# Patient Record
Sex: FEMALE | Race: WHITE | NOT HISPANIC OR LATINO | Employment: FULL TIME | URBAN - METROPOLITAN AREA
[De-identification: names, ages, dates, MRNs, and addresses within clinical notes are randomized per-mention and may not be internally consistent; named-entity substitution may affect disease eponyms.]

---

## 2020-02-26 ENCOUNTER — APPOINTMENT (OUTPATIENT)
Dept: RADIOLOGY | Facility: IMAGING CENTER | Age: 40
End: 2020-02-26
Attending: PHYSICIAN ASSISTANT
Payer: COMMERCIAL

## 2020-02-26 ENCOUNTER — OFFICE VISIT (OUTPATIENT)
Dept: URGENT CARE | Facility: CLINIC | Age: 40
End: 2020-02-26
Payer: COMMERCIAL

## 2020-02-26 VITALS
HEIGHT: 63 IN | BODY MASS INDEX: 22.5 KG/M2 | OXYGEN SATURATION: 94 % | RESPIRATION RATE: 16 BRPM | SYSTOLIC BLOOD PRESSURE: 98 MMHG | DIASTOLIC BLOOD PRESSURE: 72 MMHG | TEMPERATURE: 99.2 F | HEART RATE: 83 BPM | WEIGHT: 127 LBS

## 2020-02-26 DIAGNOSIS — S60.221A CONTUSION OF RIGHT HAND, INITIAL ENCOUNTER: ICD-10-CM

## 2020-02-26 DIAGNOSIS — M79.641 HAND PAIN, RIGHT: ICD-10-CM

## 2020-02-26 PROCEDURE — 73130 X-RAY EXAM OF HAND: CPT | Mod: TC,RT | Performed by: PHYSICIAN ASSISTANT

## 2020-02-26 PROCEDURE — 99213 OFFICE O/P EST LOW 20 MIN: CPT | Performed by: PHYSICIAN ASSISTANT

## 2020-02-26 ASSESSMENT — ENCOUNTER SYMPTOMS
TINGLING: 0
FEVER: 0
WEAKNESS: 0
ARTHRALGIAS: 1
MYALGIAS: 0
SENSORY CHANGE: 0
NUMBNESS: 0
CHILLS: 0

## 2020-02-26 NOTE — PROGRESS NOTES
"Subjective:      Maria Dolores Lincoln is a 39 y.o. female who presents with Hand Injury (right hand, colided with counter top)            Hand Injury   This is a new problem. The current episode started today (Patient was shaking a bag and hit right hand on her kitchen counter). The problem occurs constantly. The problem has been unchanged. Associated symptoms include arthralgias (right hand pain and swelling ). Pertinent negatives include no chills, fever, myalgias, numbness, rash or weakness. Exacerbated by: palpation, movement  She has tried nothing for the symptoms.       Past Medical History:   Diagnosis Date   • Asthma    • Scoliosis        Past Surgical History:   Procedure Laterality Date   • OTHER ORTHOPEDIC SURGERY      for scoliosis   • TENDON REPAIR      R hand       No family history on file.    Allergies   Allergen Reactions   • Codeine Vomiting       Medications, Allergies, and current problem list reviewed today in Epic    Review of Systems   Constitutional: Negative for chills and fever.   Musculoskeletal: Positive for arthralgias (right hand pain and swelling ) and joint pain (right hand pain). Negative for myalgias.   Skin: Negative for rash.        Bruising noted on right hand    Neurological: Negative for tingling, sensory change, weakness and numbness.      All other systems reviewed and are negative.        Objective:     BP (!) 98/72 (BP Location: Right arm, Patient Position: Sitting, BP Cuff Size: Adult)   Pulse 83   Temp 37.3 °C (99.2 °F) (Temporal)   Resp 16   Ht 1.6 m (5' 3\")   Wt 57.6 kg (127 lb)   SpO2 94%   BMI 22.50 kg/m²      Physical Exam  Constitutional:       General: She is not in acute distress.  HENT:      Head: Normocephalic and atraumatic.   Eyes:      Conjunctiva/sclera: Conjunctivae normal.   Cardiovascular:      Rate and Rhythm: Normal rate.   Pulmonary:      Effort: Pulmonary effort is normal. No respiratory distress.   Musculoskeletal:        Hands:    Neurological:    "   General: No focal deficit present.      Mental Status: She is alert and oriented to person, place, and time.   Psychiatric:         Mood and Affect: Mood normal.         Behavior: Behavior normal.         Thought Content: Thought content normal.         Judgment: Judgment normal.           2/26/2020 12:19 PM     HISTORY/REASON FOR EXAM:  Pain/Deformity Following Trauma  Second and third MCP pain     TECHNIQUE/EXAM DESCRIPTION AND NUMBER OF VIEWS:  3 views of the RIGHT hand.     COMPARISON: None     FINDINGS:     No acute fracture or dislocation.     No joint osteoarthritis.     IMPRESSION:        No acute osseous abnormality.       Assessment/Plan:       1. Contusion of right hand, initial encounter  DX-HAND 3+ RIGHT       X-ray negative. Discussed with patient.  Encouraged RICE  OTC NSAIDS  Hand brace     Differential diagnoses, Supportive care, and indications for immediate follow-up discussed with patient.   Instructed to return to clinic or nearest emergency department for any change in condition, further concerns, or worsening of symptoms.    The patient demonstrated a good understanding and agreed with the treatment plan.    Annemarie Joshi P.A.-C.